# Patient Record
Sex: MALE | URBAN - METROPOLITAN AREA
[De-identification: names, ages, dates, MRNs, and addresses within clinical notes are randomized per-mention and may not be internally consistent; named-entity substitution may affect disease eponyms.]

---

## 2017-03-20 ENCOUNTER — IMPORTED ENCOUNTER (OUTPATIENT)
Dept: URBAN - METROPOLITAN AREA CLINIC 59 | Facility: CLINIC | Age: 82
End: 2017-03-20

## 2017-03-20 PROBLEM — H35.352 CYSTOID MACULAR DEGENERATION, LEFT EYE: Noted: 2017-03-20

## 2017-03-20 PROBLEM — H25.13 BILATERAL NUCLEAR SCLEROSIS CATARACTS: Noted: 2017-03-20

## 2017-03-20 PROBLEM — H35.3132 NONEXUDATIVE AGE-RELATED MACULAR DEGENERATION, BILATERAL, INTERMEDIATE DRY STAGE: Noted: 2017-03-20

## 2017-03-20 PROBLEM — E08.3312: Noted: 2017-03-20

## 2017-03-20 PROBLEM — H35.372 PUCKERING OF MACULA, LEFT EYE: Noted: 2017-03-20

## 2017-03-20 PROBLEM — E08.3391: Noted: 2017-03-20

## 2017-03-20 PROBLEM — H18.51 FUCHS' DYSTROPHY: Noted: 2017-03-20

## 2017-03-20 PROCEDURE — 92015 DETERMINE REFRACTIVE STATE: CPT

## 2017-03-20 PROCEDURE — 92014 COMPRE OPH EXAM EST PT 1/>: CPT

## 2017-03-20 PROCEDURE — 92134 CPTRZ OPH DX IMG PST SGM RTA: CPT

## 2017-09-20 ENCOUNTER — IMPORTED ENCOUNTER (OUTPATIENT)
Dept: URBAN - METROPOLITAN AREA CLINIC 59 | Facility: CLINIC | Age: 82
End: 2017-09-20

## 2017-09-20 PROBLEM — E08.3312: Noted: 2017-09-20

## 2017-09-20 PROBLEM — E08.3391: Noted: 2017-09-20

## 2017-09-20 PROBLEM — H25.13 BILATERAL NUCLEAR SCLEROSIS CATARACTS: Noted: 2017-09-20

## 2017-09-20 PROBLEM — H18.51 FUCHS' DYSTROPHY: Noted: 2017-09-20

## 2017-09-20 PROBLEM — H35.372 PUCKERING OF MACULA, LEFT EYE: Noted: 2017-09-20

## 2017-09-20 PROCEDURE — 92014 COMPRE OPH EXAM EST PT 1/>: CPT

## 2017-09-20 PROCEDURE — 92134 CPTRZ OPH DX IMG PST SGM RTA: CPT

## 2018-03-02 ENCOUNTER — APPOINTMENT (RX ONLY)
Dept: URBAN - METROPOLITAN AREA CLINIC 348 | Facility: CLINIC | Age: 83
Setting detail: DERMATOLOGY
End: 2018-03-02

## 2018-03-02 DIAGNOSIS — L30.9 DERMATITIS, UNSPECIFIED: ICD-10-CM

## 2018-03-02 PROBLEM — L29.8 OTHER PRURITUS: Status: ACTIVE | Noted: 2018-03-02

## 2018-03-02 PROBLEM — E13.9 OTHER SPECIFIED DIABETES MELLITUS WITHOUT COMPLICATIONS: Status: ACTIVE | Noted: 2018-03-02

## 2018-03-02 PROCEDURE — ? ORDER TESTS

## 2018-03-02 PROCEDURE — ? TREATMENT REGIMEN

## 2018-03-02 PROCEDURE — ? COUNSELING

## 2018-03-02 PROCEDURE — ? PRESCRIPTION

## 2018-03-02 PROCEDURE — 99202 OFFICE O/P NEW SF 15 MIN: CPT

## 2018-03-02 RX ORDER — BETAMETHASONE DIPROPIONATE 0.5 MG/G
CREAM TOPICAL
Qty: 1 | Refills: 2 | Status: ERX | COMMUNITY
Start: 2018-03-02

## 2018-03-02 RX ADMIN — BETAMETHASONE DIPROPIONATE: 0.5 CREAM TOPICAL at 16:07

## 2018-03-02 ASSESSMENT — LOCATION DETAILED DESCRIPTION DERM
LOCATION DETAILED: RIGHT INFERIOR POSTERIOR NECK
LOCATION DETAILED: LEFT INFERIOR POSTERIOR NECK

## 2018-03-02 ASSESSMENT — LOCATION ZONE DERM: LOCATION ZONE: NECK

## 2018-03-02 ASSESSMENT — LOCATION SIMPLE DESCRIPTION DERM: LOCATION SIMPLE: POSTERIOR NECK

## 2018-03-02 NOTE — PROCEDURE: TREATMENT REGIMEN
Samples Given: Trianex - Apply twice daily to affected areas of the neck until Betamethasone is received in the mail from [facility] Samples Given: Trianex - Apply twice daily to affected areas of the neck until Betamethasone is received in the mail from [facility]

## 2018-03-02 NOTE — PROCEDURE: TREATMENT REGIMEN
Initiate Treatment: Betamethasone - Apply to the affected areas of the neck twice daily for two weeks, then as needed for flares

## 2018-03-02 NOTE — PROCEDURE: TREATMENT REGIMEN
Plan: Bacterial culture was taken today and new Rx for Betamethasone was sent to pharmacy. If rash does not improve we will consider biopsy at next visit in 3-4 weeks

## 2018-03-19 ENCOUNTER — IMPORTED ENCOUNTER (OUTPATIENT)
Dept: URBAN - METROPOLITAN AREA CLINIC 59 | Facility: CLINIC | Age: 83
End: 2018-03-19

## 2018-03-19 PROBLEM — E08.3391: Noted: 2018-03-19

## 2018-03-19 PROBLEM — H18.51 FUCHS' DYSTROPHY: Noted: 2018-03-19

## 2018-03-19 PROBLEM — H25.13 BILATERAL NUCLEAR SCLEROSIS CATARACTS: Noted: 2018-03-19

## 2018-03-19 PROBLEM — H35.372 PUCKERING OF MACULA, LEFT EYE: Noted: 2018-03-19

## 2018-03-19 PROBLEM — H35.3132 NONEXUDATIVE AGE-RELATED MACULAR DEGENERATION, BILATERAL, INTERMEDIATE DRY STAGE: Noted: 2018-03-19

## 2018-03-19 PROBLEM — H35.352 CYSTOID MACULAR DEGENERATION, LEFT EYE: Noted: 2018-03-19

## 2018-03-19 PROBLEM — E08.3312: Noted: 2018-03-19

## 2018-03-19 PROCEDURE — 92134 CPTRZ OPH DX IMG PST SGM RTA: CPT

## 2018-03-19 PROCEDURE — 92014 COMPRE OPH EXAM EST PT 1/>: CPT

## 2018-03-23 ENCOUNTER — APPOINTMENT (RX ONLY)
Dept: URBAN - METROPOLITAN AREA CLINIC 348 | Facility: CLINIC | Age: 83
Setting detail: DERMATOLOGY
End: 2018-03-23

## 2018-03-23 DIAGNOSIS — L30.4 ERYTHEMA INTERTRIGO: ICD-10-CM

## 2018-03-23 DIAGNOSIS — L30.9 DERMATITIS, UNSPECIFIED: ICD-10-CM | Status: IMPROVED

## 2018-03-23 PROBLEM — L85.3 XEROSIS CUTIS: Status: ACTIVE | Noted: 2018-03-23

## 2018-03-23 PROCEDURE — ? PRESCRIPTION

## 2018-03-23 PROCEDURE — ? COUNSELING

## 2018-03-23 PROCEDURE — 99213 OFFICE O/P EST LOW 20 MIN: CPT

## 2018-03-23 PROCEDURE — ? TREATMENT REGIMEN

## 2018-03-23 RX ORDER — NYSTATIN 100000 [USP'U]/G
POWDER TOPICAL
Qty: 2 | Refills: 5 | Status: CANCELLED
Stop reason: CLARIF

## 2018-03-23 ASSESSMENT — LOCATION DETAILED DESCRIPTION DERM
LOCATION DETAILED: RIGHT ANTERIOR PROXIMAL THIGH
LOCATION DETAILED: LEFT INFERIOR POSTERIOR NECK
LOCATION DETAILED: RIGHT INFERIOR POSTERIOR NECK
LOCATION DETAILED: LEFT ANTERIOR PROXIMAL THIGH

## 2018-03-23 ASSESSMENT — LOCATION ZONE DERM
LOCATION ZONE: LEG
LOCATION ZONE: NECK

## 2018-03-23 ASSESSMENT — LOCATION SIMPLE DESCRIPTION DERM
LOCATION SIMPLE: POSTERIOR NECK
LOCATION SIMPLE: LEFT THIGH
LOCATION SIMPLE: RIGHT THIGH

## 2018-03-23 NOTE — PROCEDURE: TREATMENT REGIMEN
Initiate Treatment: Nystatin powder - Apply to the affected areas of the groin as needed for irritation
Detail Level: Zone
Continue Regimen: Betamethasone - Apply to the neck as needed for flares.\\nDove for sensitive skin

## 2018-10-10 ENCOUNTER — IMPORTED ENCOUNTER (OUTPATIENT)
Dept: URBAN - METROPOLITAN AREA CLINIC 59 | Facility: CLINIC | Age: 83
End: 2018-10-10

## 2018-10-10 PROBLEM — H18.51 FUCHS' DYSTROPHY: Noted: 2018-10-10

## 2018-10-10 PROBLEM — E08.3312: Noted: 2018-10-10

## 2018-10-10 PROBLEM — H35.3132 NONEXUDATIVE AGE-RELATED MACULAR DEGENERATION, BILATERAL, INTERMEDIATE DRY STAGE: Noted: 2018-10-10

## 2018-10-10 PROBLEM — H35.372 PUCKERING OF MACULA, LEFT EYE: Noted: 2018-10-10

## 2018-10-10 PROBLEM — H35.352 CYSTOID MACULAR DEGENERATION, LEFT EYE: Noted: 2018-10-10

## 2018-10-10 PROBLEM — H25.13 BILATERAL NUCLEAR SCLEROSIS CATARACTS: Noted: 2018-10-10

## 2018-10-10 PROBLEM — E08.3391: Noted: 2018-10-10

## 2018-10-10 PROCEDURE — 92014 COMPRE OPH EXAM EST PT 1/>: CPT

## 2018-10-10 PROCEDURE — 92134 CPTRZ OPH DX IMG PST SGM RTA: CPT

## 2019-04-03 ENCOUNTER — IMPORTED ENCOUNTER (OUTPATIENT)
Dept: URBAN - METROPOLITAN AREA CLINIC 59 | Facility: CLINIC | Age: 84
End: 2019-04-03

## 2019-04-03 PROBLEM — H18.51 FUCHS' DYSTROPHY: Noted: 2019-04-03

## 2019-04-03 PROBLEM — H25.13 BILATERAL NUCLEAR SCLEROSIS CATARACTS: Noted: 2019-04-03

## 2019-04-03 PROBLEM — H35.352 CYSTOID MACULAR DEGENERATION, LEFT EYE: Noted: 2019-04-03

## 2019-04-03 PROBLEM — E08.3312: Noted: 2019-04-03

## 2019-04-03 PROBLEM — H35.3132 NONEXUDATIVE AGE-RELATED MACULAR DEGENERATION, BILATERAL, INTERMEDIATE DRY STAGE: Noted: 2019-04-03

## 2019-04-03 PROBLEM — H35.372 PUCKERING OF MACULA, LEFT EYE: Noted: 2019-04-03

## 2019-04-03 PROBLEM — E08.3391: Noted: 2019-04-03

## 2019-04-03 PROCEDURE — 92134 CPTRZ OPH DX IMG PST SGM RTA: CPT

## 2019-04-03 PROCEDURE — 92014 COMPRE OPH EXAM EST PT 1/>: CPT

## 2019-10-02 ENCOUNTER — IMPORTED ENCOUNTER (OUTPATIENT)
Dept: URBAN - METROPOLITAN AREA CLINIC 59 | Facility: CLINIC | Age: 84
End: 2019-10-02

## 2019-10-02 PROBLEM — H35.3132 NONEXUDATIVE AGE-RELATED MACULAR DEGENERATION, BILATERAL, INTERMEDIATE DRY STAGE: Noted: 2019-10-02

## 2019-10-02 PROBLEM — H25.13 BILATERAL NUCLEAR SCLEROSIS CATARACTS: Noted: 2019-10-02

## 2019-10-02 PROBLEM — E08.3312: Noted: 2019-10-02

## 2019-10-02 PROBLEM — H18.51 FUCHS' DYSTROPHY: Noted: 2019-10-02

## 2019-10-02 PROBLEM — E08.3391: Noted: 2019-10-02

## 2019-10-02 PROBLEM — H35.372 PUCKERING OF MACULA, LEFT EYE: Noted: 2019-10-02

## 2019-10-02 PROCEDURE — 92134 CPTRZ OPH DX IMG PST SGM RTA: CPT

## 2019-10-02 PROCEDURE — 92014 COMPRE OPH EXAM EST PT 1/>: CPT

## 2020-06-24 ENCOUNTER — IMPORTED ENCOUNTER (OUTPATIENT)
Dept: URBAN - METROPOLITAN AREA CLINIC 59 | Facility: CLINIC | Age: 85
End: 2020-06-24

## 2020-06-24 PROBLEM — H25.11 NUCLEAR SCLEROSIS CATARACT OF RT EYE: Noted: 2020-06-24

## 2020-06-24 PROBLEM — H25.12 NUCLEAR SCLEROSIS CATARACT OF LT EYE: Noted: 2020-06-24

## 2020-06-24 PROBLEM — H35.372 PUCKERING OF MACULA, LEFT EYE: Noted: 2020-06-24

## 2020-06-24 PROBLEM — H18.51 FUCHS' DYSTROPHY: Noted: 2020-06-24

## 2020-06-24 PROBLEM — E08.3391: Noted: 2020-06-24

## 2020-06-24 PROCEDURE — 92014 COMPRE OPH EXAM EST PT 1/>: CPT

## 2020-06-24 PROCEDURE — 92134 CPTRZ OPH DX IMG PST SGM RTA: CPT

## 2021-03-15 ENCOUNTER — IMPORTED ENCOUNTER (OUTPATIENT)
Dept: URBAN - METROPOLITAN AREA CLINIC 59 | Facility: CLINIC | Age: 86
End: 2021-03-15

## 2021-03-15 PROBLEM — H35.3132 NONEXUDATIVE AGE-RELATED MACULAR DEGENERATION, BILATERAL, INTERMEDIATE DRY STAGE: Noted: 2021-03-15

## 2021-03-15 PROBLEM — H25.11 NUCLEAR SCLEROSIS CATARACT OF RT EYE: Noted: 2021-03-15

## 2021-03-15 PROBLEM — H18.593 OTHER HEREDITARY CORNEAL DYSTROPHIES, BILATERAL: Noted: 2021-03-15

## 2021-03-15 PROBLEM — E08.3312: Noted: 2021-03-15

## 2021-03-15 PROBLEM — E08.3391: Noted: 2021-03-15

## 2021-03-15 PROBLEM — H35.372 PUCKERING OF MACULA, LEFT EYE: Noted: 2021-03-15

## 2021-03-15 PROBLEM — H25.12 NUCLEAR SCLEROSIS CATARACT OF LT EYE: Noted: 2021-03-15

## 2021-03-15 PROCEDURE — 92014 COMPRE OPH EXAM EST PT 1/>: CPT

## 2021-03-15 PROCEDURE — 92134 CPTRZ OPH DX IMG PST SGM RTA: CPT

## 2021-11-03 ENCOUNTER — IMPORTED ENCOUNTER (OUTPATIENT)
Dept: URBAN - METROPOLITAN AREA CLINIC 59 | Facility: CLINIC | Age: 86
End: 2021-11-03

## 2021-11-03 PROBLEM — H25.12 NUCLEAR SCLEROSIS CATARACT OF LT EYE: Noted: 2021-11-03

## 2021-11-03 PROBLEM — H18.593 OTHER HEREDITARY CORNEAL DYSTROPHIES, BILATERAL: Noted: 2021-11-03

## 2021-11-03 PROBLEM — H25.11 NUCLEAR SCLEROSIS CATARACT OF RT EYE: Noted: 2021-11-03

## 2021-11-03 PROBLEM — H35.3132 NONEXUDATIVE AGE-RELATED MACULAR DEGENERATION, BILATERAL, INTERMEDIATE DRY STAGE: Noted: 2021-11-03

## 2021-11-03 PROBLEM — E08.3391: Noted: 2021-11-03

## 2021-11-03 PROBLEM — E08.3312: Noted: 2021-11-03

## 2021-11-03 PROBLEM — H35.372 PUCKERING OF MACULA, LEFT EYE: Noted: 2021-11-03

## 2021-11-03 PROCEDURE — 92014 COMPRE OPH EXAM EST PT 1/>: CPT

## 2021-11-03 PROCEDURE — 92134 CPTRZ OPH DX IMG PST SGM RTA: CPT

## 2022-08-03 ENCOUNTER — IMPORTED ENCOUNTER (OUTPATIENT)
Dept: URBAN - METROPOLITAN AREA CLINIC 59 | Facility: CLINIC | Age: 87
End: 2022-08-03

## 2022-08-03 PROBLEM — H25.11 NUCLEAR SCLEROSIS CATARACT OF RT EYE: Noted: 2022-08-03

## 2022-08-03 PROBLEM — H35.372 PUCKERING OF MACULA, LEFT EYE: Noted: 2022-08-03

## 2022-08-03 PROBLEM — H25.12 NUCLEAR SCLEROSIS CATARACT OF LT EYE: Noted: 2022-08-03

## 2022-08-03 PROBLEM — H35.3132 NONEXUDATIVE AGE-RELATED MACULAR DEGENERATION, BILATERAL, INTERMEDIATE DRY STAGE: Noted: 2022-08-03

## 2022-08-03 PROBLEM — E08.3391: Noted: 2022-08-03

## 2022-08-03 PROBLEM — H18.593 OTHER HEREDITARY CORNEAL DYSTROPHIES, BILATERAL: Noted: 2022-08-03

## 2022-08-03 PROCEDURE — 92134 CPTRZ OPH DX IMG PST SGM RTA: CPT

## 2022-08-03 PROCEDURE — 92014 COMPRE OPH EXAM EST PT 1/>: CPT

## 2023-10-15 ASSESSMENT — TONOMETRY
OS_IOP_MMHG: 16
OS_IOP_MMHG: 18
OS_IOP_MMHG: 17
OD_IOP_MMHG: 16
OS_IOP_MMHG: 16
OD_IOP_MMHG: 18
OD_IOP_MMHG: 12
OD_IOP_MMHG: 16
OD_IOP_MMHG: 17
OD_IOP_MMHG: 16
OS_IOP_MMHG: 15
OD_IOP_MMHG: 18
OD_IOP_MMHG: 15
OS_IOP_MMHG: 12
OS_IOP_MMHG: 15
OS_IOP_MMHG: 18
OD_IOP_MMHG: 13
OS_IOP_MMHG: 14

## 2023-10-15 ASSESSMENT — VISUAL ACUITY
OD_CC: 20/25+1
OS_CC: 20/150-
OD_CC: 20/60+2
OD_CC: 20/50
OD_CC: 20/40+2
OU_CC: 20/60-2
OU_CC: 20/50+
OD_CC: 20/40+2
OS_CC: 20/100+2
OD_CC: 20/30-1
OD_CC: 20/60-2
OS_CC: 20/150
OD_CC: 20/30
OS_CC: 20/200-1
OS_CC: 20/150
OS_CC: 20/150-2
OS_CC: 20/150-
OD_CC: 20/70
OS_CC: 20/150
OD_CC: 20/30-2
OS_CC: 20/100-1
OS_CC: 20/100-2

## 2023-10-15 ASSESSMENT — PACHYMETRY
OD_CT_UM: C:  FINAL: 0.000; P:
OS_CT_UM: C:  FINAL: 0.000; P: